# Patient Record
Sex: FEMALE | Race: WHITE | NOT HISPANIC OR LATINO | Employment: FULL TIME | URBAN - METROPOLITAN AREA
[De-identification: names, ages, dates, MRNs, and addresses within clinical notes are randomized per-mention and may not be internally consistent; named-entity substitution may affect disease eponyms.]

---
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
## 2024-02-16 NOTE — Clinical Note
[facility] [place]  [address]  [place] [address]    Shreya Dunn was seen and treated in our emergency department on 8/31/2023.  She may return to work on 09/05/2023.         Thank you for choosing [facility].    Star Ruiz MD       36.8